# Patient Record
Sex: MALE | Race: BLACK OR AFRICAN AMERICAN | NOT HISPANIC OR LATINO | Employment: UNEMPLOYED | URBAN - METROPOLITAN AREA
[De-identification: names, ages, dates, MRNs, and addresses within clinical notes are randomized per-mention and may not be internally consistent; named-entity substitution may affect disease eponyms.]

---

## 2023-11-27 ENCOUNTER — TELEPHONE (OUTPATIENT)
Dept: NEUROLOGY | Facility: CLINIC | Age: 53
End: 2023-11-27
Payer: MEDICAID

## 2023-11-27 NOTE — TELEPHONE ENCOUNTER
----- Message from Farideh Veliz sent at 11/27/2023  9:24 AM CST -----  Regarding: Sooner Appointment Request  Contact: patient at [phone]  Type:  Sooner Appointment Request    Caller is requesting a sooner appointment.  Caller declined first available appointment listed below.  Caller will not accept being placed on the waitlist and is requesting a message be sent to doctor.    Name of Caller:  patient at [phone]    Symptoms:  referral in system for EMG    Additional Information:  Please call and advise. Thank you

## 2023-11-27 NOTE — TELEPHONE ENCOUNTER
Spoke to the pt this morning, informed him that the order for the EMG was put in as a referral.  Left message with [facility] to fax the EMG order to us at [phone].  Left message for the pt that we are waiting for [facility] to refax the order.

## 2023-11-29 NOTE — TELEPHONE ENCOUNTER
Left message with [facility] to fax the EMG order to us at [phone].  Left message for the pt that we are waiting for [facility] to refax the order.   Left message for the pt to call in regards to having the order for the EMG faxed to [phone].

## 2023-11-29 NOTE — TELEPHONE ENCOUNTER
Pt notified that I have called [facility] twice trying to get the order for the EMG with no response.

## 2023-11-30 ENCOUNTER — TELEPHONE (OUTPATIENT)
Dept: NEUROLOGY | Facility: CLINIC | Age: 53
End: 2023-11-30
Payer: MEDICAID

## 2023-11-30 NOTE — TELEPHONE ENCOUNTER
----- Message from Farideh Veliz sent at 11/30/2023  4:20 PM CST -----  Regarding: Sooner Appointment Request  Contact: patient at [phone]  Type:  Sooner Appointment Request    Name of Caller:  patient at [phone]    Symptoms:  EMG from referral    Additional Information:  Please call and advise. Thank you

## 2023-12-01 ENCOUNTER — TELEPHONE (OUTPATIENT)
Dept: NEUROLOGY | Facility: CLINIC | Age: 53
End: 2023-12-01
Payer: MEDICAID

## 2023-12-01 NOTE — TELEPHONE ENCOUNTER
Returned call, left message advising the pt that we still have not received the EMG order form [facility]. Asked the pt to call [facility] and request the order be faxed to [phone].

## 2023-12-01 NOTE — TELEPHONE ENCOUNTER
----- Message from Rishi Marinelli sent at 12/1/2023 10:20 AM CST -----  Regarding: ret call  Contact: SILVANO MANCIA [[id number]]  Type:  Patient Returning Call    Who Called:  Silvano    Who Left Message for Patient:  Erica    Does the patient know what this is regarding?:  Yes-EMG    Best Call Back Number:  [phone] (home)     Additional Information:  Asking for email also. Please call to advise.

## 2023-12-04 ENCOUNTER — LAB VISIT (OUTPATIENT)
Dept: LAB | Facility: HOSPITAL | Age: 53
End: 2023-12-04
Payer: MEDICAID

## 2023-12-04 DIAGNOSIS — G70.00 MG (MYASTHENIA GRAVIS): Primary | ICD-10-CM

## 2023-12-04 DIAGNOSIS — G70.00 MG (MYASTHENIA GRAVIS): ICD-10-CM

## 2023-12-04 PROCEDURE — 82552 ASSAY OF CPK IN BLOOD: CPT

## 2023-12-04 PROCEDURE — 36415 COLL VENOUS BLD VENIPUNCTURE: CPT | Mod: PO

## 2023-12-04 PROCEDURE — 82397 CHEMILUMINESCENT ASSAY: CPT

## 2023-12-04 PROCEDURE — 83516 IMMUNOASSAY NONANTIBODY: CPT | Mod: 59

## 2023-12-04 PROCEDURE — 83516 IMMUNOASSAY NONANTIBODY: CPT

## 2023-12-04 PROCEDURE — 82085 ASSAY OF ALDOLASE: CPT

## 2023-12-04 PROCEDURE — 84443 ASSAY THYROID STIM HORMONE: CPT

## 2023-12-04 PROCEDURE — 86255 FLUORESCENT ANTIBODY SCREEN: CPT

## 2023-12-04 PROCEDURE — 83519 RIA NONANTIBODY: CPT

## 2023-12-04 PROCEDURE — 30000890 MISCELLANEOUS SENDOUT TEST, BLOOD

## 2023-12-04 PROCEDURE — 83519 RIA NONANTIBODY: CPT | Mod: 59

## 2023-12-05 LAB — TSH SERPL DL<=0.005 MIU/L-ACNC: 0.89 UIU/ML (ref 0.4–4)

## 2023-12-06 LAB
ALDOLASE SERPL-CCNC: 8.9 U/L (ref 1.2–7.6)
HMGCR IGG SER IA-ACNC: <20 CU

## 2023-12-07 LAB
ACHR BIND AB SER-SCNC: 0 NMOL/L
ACHR MOD AB/ACHR TOTAL SFR SER: 0 %

## 2023-12-08 LAB
ACHR BLOCK AB/ACHR TOTAL SFR SER: 0 % (ref 0–26)
CK BB CFR SERPL ELPH: 0 %
CK MB CFR SERPL ELPH: 0 % (ref 0–3.3)
CK MM CFR SERPL ELPH: 100 % (ref 96.7–100)
CK SERPL-CCNC: 72 U/L (ref 30–223)
MISCELLANEOUS TEST NAME: NORMAL
MUSK ANTIBODY TEST: 0 NMOL/L (ref 0–0.02)
REFERENCE LAB: NORMAL
SPECIMEN TYPE: NORMAL
TEST RESULT: NORMAL

## 2024-01-03 LAB — LRP4 AUTOANTIBODY: NORMAL

## 2024-01-10 ENCOUNTER — TELEPHONE (OUTPATIENT)
Dept: NEUROLOGY | Facility: CLINIC | Age: 54
End: 2024-01-10
Payer: MEDICAID

## 2024-01-10 DIAGNOSIS — G70.00 MYASTHENIA GRAVIS: Primary | ICD-10-CM

## 2024-01-10 NOTE — TELEPHONE ENCOUNTER
----- Message from Arelis Ni sent at 1/10/2024 11:38 AM CST -----  Regarding: sooner apt  Contact: patient  Type:  Sooner Appointment Request    Caller is requesting a sooner appointment.  Caller declined first available appointment listed below.  Caller will not accept being placed on the waitlist and is requesting a message be sent to doctor.    Name of Caller:  patient   When is the first available appointment?    Symptoms:  see orders  Would the patient rather a call back or a response via [facility]?   [facility] Call Back Number:  [phone]    Additional Information:  schedule referral thanks! Been waiting for a month to be scheduled.

## 2024-03-07 ENCOUNTER — TELEPHONE (OUTPATIENT)
Dept: NEUROLOGY | Facility: CLINIC | Age: 54
End: 2024-03-07
Payer: MEDICAID

## 2024-03-07 ENCOUNTER — PROCEDURE VISIT (OUTPATIENT)
Dept: NEUROLOGY | Facility: CLINIC | Age: 54
End: 2024-03-07
Payer: MEDICAID

## 2024-03-07 DIAGNOSIS — R06.02 SHORTNESS OF BREATH: ICD-10-CM

## 2024-03-07 DIAGNOSIS — R26.9 ABNORMAL GAIT: ICD-10-CM

## 2024-03-07 DIAGNOSIS — G70.00 MYASTHENIA GRAVIS: ICD-10-CM

## 2024-03-07 DIAGNOSIS — G58.8 PHRENIC NEUROPATHY: Primary | ICD-10-CM

## 2024-03-07 DIAGNOSIS — G56.02 CARPAL TUNNEL SYNDROME, LEFT: ICD-10-CM

## 2024-03-07 PROCEDURE — 95937 NEUROMUSCULAR JUNCTION TEST: CPT | Mod: 26,S$PBB,, | Performed by: PSYCHIATRY & NEUROLOGY

## 2024-03-07 PROCEDURE — 95886 MUSC TEST DONE W/N TEST COMP: CPT | Mod: PBBFAC,PO | Performed by: PSYCHIATRY & NEUROLOGY

## 2024-03-07 PROCEDURE — 95937 NEUROMUSCULAR JUNCTION TEST: CPT | Mod: PBBFAC,PO | Performed by: PSYCHIATRY & NEUROLOGY

## 2024-03-07 PROCEDURE — 95886 MUSC TEST DONE W/N TEST COMP: CPT | Mod: 26,S$PBB,, | Performed by: PSYCHIATRY & NEUROLOGY

## 2024-03-07 PROCEDURE — 95912 NRV CNDJ TEST 11-12 STUDIES: CPT | Mod: 26,S$PBB,, | Performed by: PSYCHIATRY & NEUROLOGY

## 2024-03-07 PROCEDURE — 95912 NRV CNDJ TEST 11-12 STUDIES: CPT | Mod: PBBFAC,PO | Performed by: PSYCHIATRY & NEUROLOGY

## 2024-03-07 NOTE — TELEPHONE ENCOUNTER
----- Message from Chloe Gonzalez, Patient Care Assistant sent at 3/7/2024  2:40 PM CST -----  Regarding: returning call  Contact: pt  Type:  Patient Returning Call    Who Called:  pt     Who Left Message for Patient:  not sure     Does the patient know what this is regarding?:  not sure     Best Call Back Number:  [phone] (home)     Additional Information:  please call to advise. Thanks!

## 2024-03-11 NOTE — PROCEDURES
[facility]  [address] [facility] [address]  [place] [address]  [phone]      Full Name: Silvano Sol Gender: Male  Patient ID: [id number] YOB: 1970      Visit Date: 3/7/2024 2:56 PM  Age: 53 Years  Examining Physician: Katie Riddle D.O., ABPN, AOBNP, ABEM   Referring Physician: ABRAM Pettit  Technologist: LEONARDO Blake   Height: 5 feet 10 inch  History: Patient complaining of fatigue, gait instability and severe SOB.  Currently on anticoagulation.  EMG of the upper extremity with neuromuscular junction testing has been requested.      Sensory NCS      Nerve / Sites Rec. Site Onset Lat Peak Lat NP Amp Segments Distance Velocity Temp.     ms ms µV  cm m/s °C   L Median - Digit II (Antidromic)      Wrist Dig II 3.85 4.81 13.0 Wrist - Dig II 13 34 34      Ref.   ?3.60 ?15.0 Ref.  ?50    L Ulnar - Digit V (Antidromic)      Wrist Dig V 2.48 3.21 13.0 Wrist - Dig V 11 44 34      Ref.   ?3.10 ?12.0 Ref.  ?50    L Sural - Ankle (Calf)      Calf Ankle 2.85 3.54 4.7 Calf - Ankle 14 49 34      Ref.   ?4.60 ?4.0 Ref.  ?40    L Superficial peroneal - Ankle      Lat leg Ankle 2.63 3.42 6.3 Lat leg - Ankle 12 46 34      Ref.   ?4.50 ?4.0 Ref.          Motor NCS      Nerve / Sites Muscle Latency Ref. Amplitude Ref. Amp % Duration Segments Distance Lat Diff Ref. Velocity Ref. Temp.     ms ms mV mV % ms  cm ms ms m/s m/s °C   L Median - APB      Wrist APB 4.15 ?4.00 8.1 ?6.0 100 7.79 Wrist - APB      34      Elbow APB 9.40  7.7  94.4 8.17 Elbow - Wrist 26 5.25  50 ?50 34   L Ulnar - ADM      Wrist ADM 3.08 ?3.10 9.3 ?7.0 100 7.00 Wrist - ADM      34      B.Elbow ADM 7.75  8.9  95.5 7.71 B.Elbow - Wrist 22 4.67  47 ?50 34      A.Elbow ADM 10.15  8.5  91.4 7.27 A.Elbow - B.Elbow 11 2.40  46  34   L Peroneal - EDB      Ankle EDB 4.52 ?6.00 4.8 ?2.5 100 6.56 Ankle - EDB      34      Fib head EDB 12.33  4.0  84.5 7.23 Fib head - Ankle 34 7.81  44 ?40 34       Pop fossa EDB 14.90  3.6  75.9 7.02 Pop fossa - Fib head 11 2.56  43  34   L Tibial - AH      Ankle AH 5.10 ?6.00 5.0 ?4.0 100 4.54 Ankle - AH      34      Pop fossa AH 15.60  4.5  90.4  Pop fossa - Ankle 43 10.50  41 ?40 34   L Median, Ulnar - Lumbrical-Interossei      Median Wrist Lumb II 4.38  1.0  100 5.31 Median Wrist - Lumb II 10     34      Ulnar Wrist Lumb II 3.46  6.8  692 5.27 Ulnar Wrist - Lumb II 10     34           Median Wrist - Ulnar Wrist  0.92 ?0.50   34   L Phrenic - Diaphragm      Neck Diaphragm NR  NR  NR NR          R Phrenic - Diaphragm      Neck Diaphragm NR  NR  NR NR              F  Wave      Nerve Fmin Ref.    ms ms   L Peroneal - EDB 55.36 ?56.00   L Tibial - AH 55.94 ?56.00   L Median - APB 32.08 ?31.00   L Ulnar - ADM 31.88 ?32.00       Rep Stim      Anatomy / Train Rate Ampl. Ampl 4-1 Fac Ampl    Hz mV % %   L Ulnar - ADM   Baseline @1Hz 1 11.0 3.7 100   Baseline @3Hz 3 11.9 -0.4 107   Post Exercise @0:00 3 11.1 2.4 101   @ 0:30 3 10.5 -3.6 94.7   @ 1:00 3 11.9 -2.4 108   @ 2:00 3 12.3 -0.8 112   @ 3:00 3 11.2 1.2 102   @ 4:00 3 12.2 0.9 111       EMG Summary Table     Spontaneous Recruitment Activation Duration Amplitude Polyphasia Comment   Muscle Ins Act Fib Fasc Pattern - - - - -   L. First dorsal interosseous Normal 0 0 Normal Fair Normal Normal Normal Normal   L. Abductor pollicis brevis Normal 0 0 Sl Dec Fair N/+1 Normal N/+1 Normal   L. Pronator teres Normal 0 0 Normal Fair Normal Normal Normal Normal   L. Biceps brachii Normal 0 0 Normal Fair Normal Normal Normal Normal   L. Triceps brachii Normal 0 0 Normal Fair Normal Normal Normal Normal   L. Deltoid Normal 0 0 Normal Fair Normal Normal Normal Normal   L. Tibialis anterior Normal 0 0 Normal Fair Normal Normal Normal Normal   L. Gastrocnemius (Medial head) Normal 0 0 Normal Fair Normal Normal Normal Normal   L. Extensor hallucis longus Normal 0 0 Normal Fair Normal Normal Normal Normal   L. Vastus medialis Normal 0 0 Normal  Fair Normal Normal Normal Normal   L. Tensor fasciae latae Normal 0 0 Normal Fair Normal Normal Normal Normal         Silvano Sol [id number] 3/7/2024 2:56 PM     3 of 5    Summary:  Due to the patient's chief complaint, the EMG was expanded to include other testing.  Nerve conduction studies were performed in the left upper and lower extremities.  Left median sensory response was prolonged with a reduced amplitude.  Left ulnar sensory response was borderline prolonged with normal amplitude.  Left sural and superficial peroneal sensory responses were normal.  Left median motor response was borderline prolonged with a normal amplitude and velocity.  Left ulnar motor response was normal in amplitude and latencies with borderline slowing of the distal velocity.  Left peroneal and tibial motor responses were normal in amplitude, latency and velocity.  Left median versus ulnar 2nd lumbrical interosseous comparison studies revealed a prolonged median latencies compared to the ulnar across the carpal tunnel.  Bilateral phrenic nerve responses were absent.  Repetitive nerve stimulation at 3 Hz of the left ulnar nerve, recording the first dorsal interosseous, showed no abnormal decrement at baseline or for the following four minutes after one minute of exercise testing. Left peroneal, tibial and ulnar minimal F wave latencies were normal.  Needle EMG was performed in the left upper and lower extremities.  Some of the study was limited secondary to anticoagulation including not study of the paraspinal muscles.  No active denervation was present in any muscle tested.  Motor units were enlarged and poly phasic with reduced recruitment in the left abductor pollicis brevis muscle.  Fair activation was noted throughout every muscle tested.  All other motor unit morphology and recruitment patterns were normal.  No myopathic motor units were noted on the study.    Impression:  This is an abnormal EMG of the left upper and lower  extremities including repetitive stimulation and phrenic nerve studies.  The findings are as follows:  Bilateral, severe, phrenic neuropathy.  Unable to determine age on this study.  Chronic, mild, left median mononeuropathy across the wrist (carpal tunnel syndrome) without active denervation.  There is evidence that is suggestive, but not diagnostic of very early ulnar neuropathy that is not localizable at this time in the left upper extremity, clinical correlation is advised.  The reduced activation noted throughout the study is consistent with a central process such as limitation in movement from pain, lack of volition, orthopedic injury, or a condition of the central nervous system.    There is no evidence of any other focal neuropathy, peripheral neuropathy, plexopathy or radiculopathy on this study.  In addition, there is no evidence of myopathy or dysfunction of the neuromuscular junction.  There is no evidence of lower motor neuron disease on this study.    Thank you for referring to the [facility] in [place]. Please feel free to contact the clinic if you have any further questions regarding this study or report.       _____________________________  Katie Riddle D.O., ABPN, AOBNP, BERNARDA Sol [id number] 3/7/2024 2:56 PM     3 of 5

## 2024-03-12 ENCOUNTER — PATIENT MESSAGE (OUTPATIENT)
Dept: NEUROLOGY | Facility: CLINIC | Age: 54
End: 2024-03-12
Payer: MEDICAID

## 2024-03-26 ENCOUNTER — OFFICE VISIT (OUTPATIENT)
Dept: NEUROLOGY | Facility: CLINIC | Age: 54
End: 2024-03-26
Payer: MEDICAID

## 2024-03-26 VITALS — SYSTOLIC BLOOD PRESSURE: 132 MMHG | DIASTOLIC BLOOD PRESSURE: 87 MMHG | WEIGHT: 177.25 LBS | HEART RATE: 61 BPM

## 2024-03-26 DIAGNOSIS — R06.02 SHORTNESS OF BREATH: ICD-10-CM

## 2024-03-26 DIAGNOSIS — G58.8 PHRENIC NEUROPATHY: Primary | ICD-10-CM

## 2024-03-26 DIAGNOSIS — R29.2 ABNORMAL REFLEXES: ICD-10-CM

## 2024-03-26 DIAGNOSIS — R27.0 ATAXIA: ICD-10-CM

## 2024-03-26 PROBLEM — I25.10 ARTERIOSCLEROSIS OF CORONARY ARTERY: Status: ACTIVE | Noted: 2021-01-09

## 2024-03-26 PROBLEM — E78.2 MIXED HYPERLIPIDEMIA: Status: ACTIVE | Noted: 2021-04-28

## 2024-03-26 PROBLEM — I10 ESSENTIAL HYPERTENSION: Status: ACTIVE | Noted: 2021-01-13

## 2024-03-26 PROBLEM — I48.0 PAROXYSMAL ATRIAL FIBRILLATION: Chronic | Status: ACTIVE | Noted: 2021-01-05

## 2024-03-26 PROBLEM — N18.2 CHRONIC KIDNEY DISEASE, STAGE 2 (MILD): Chronic | Status: ACTIVE | Noted: 2021-04-28

## 2024-03-26 PROCEDURE — 1160F RVW MEDS BY RX/DR IN RCRD: CPT | Mod: CPTII,,, | Performed by: PSYCHIATRY & NEUROLOGY

## 2024-03-26 PROCEDURE — 99213 OFFICE O/P EST LOW 20 MIN: CPT | Mod: PBBFAC,PO | Performed by: PSYCHIATRY & NEUROLOGY

## 2024-03-26 PROCEDURE — 3079F DIAST BP 80-89 MM HG: CPT | Mod: CPTII,,, | Performed by: PSYCHIATRY & NEUROLOGY

## 2024-03-26 PROCEDURE — 3075F SYST BP GE 130 - 139MM HG: CPT | Mod: CPTII,,, | Performed by: PSYCHIATRY & NEUROLOGY

## 2024-03-26 PROCEDURE — G2211 COMPLEX E/M VISIT ADD ON: HCPCS | Mod: S$PBB,,, | Performed by: PSYCHIATRY & NEUROLOGY

## 2024-03-26 PROCEDURE — 99205 OFFICE O/P NEW HI 60 MIN: CPT | Mod: S$PBB,,, | Performed by: PSYCHIATRY & NEUROLOGY

## 2024-03-26 PROCEDURE — 99999 PR PBB SHADOW E&M-EST. PATIENT-LVL III: CPT | Mod: PBBFAC,,, | Performed by: PSYCHIATRY & NEUROLOGY

## 2024-03-26 PROCEDURE — 1159F MED LIST DOCD IN RCRD: CPT | Mod: CPTII,,, | Performed by: PSYCHIATRY & NEUROLOGY

## 2024-03-26 RX ORDER — ASPIRIN 325 MG
1 TABLET, DELAYED RELEASE (ENTERIC COATED) ORAL
COMMUNITY

## 2024-03-26 RX ORDER — SERTRALINE HYDROCHLORIDE 50 MG/1
50 TABLET, FILM COATED ORAL
COMMUNITY

## 2024-03-26 RX ORDER — ROSUVASTATIN CALCIUM 40 MG/1
1 TABLET, COATED ORAL DAILY
COMMUNITY

## 2024-03-26 RX ORDER — METOPROLOL SUCCINATE 100 MG/1
1 TABLET, EXTENDED RELEASE ORAL DAILY
COMMUNITY

## 2024-03-26 RX ORDER — HYDRALAZINE HYDROCHLORIDE 50 MG/1
TABLET, FILM COATED ORAL
COMMUNITY
Start: 2021-01-05

## 2024-03-26 RX ORDER — AMLODIPINE BESYLATE 5 MG/1
1 TABLET ORAL DAILY
COMMUNITY
Start: 2019-09-01

## 2024-03-26 RX ORDER — APIXABAN 5 MG/1
5 TABLET, FILM COATED ORAL 2 TIMES DAILY
COMMUNITY

## 2024-03-26 RX ORDER — CLOPIDOGREL BISULFATE 75 MG/1
1 TABLET ORAL DAILY
COMMUNITY
Start: 2021-01-05

## 2024-03-26 RX ORDER — PYRIDOSTIGMINE BROMIDE 60 MG/1
60 TABLET ORAL 3 TIMES DAILY
COMMUNITY

## 2024-03-26 NOTE — PROGRESS NOTES
NEUROLOGY  Outpatient CONSULT  [facility]  [address] [facility], [place] [address]  [phone] (office) / [phone] (fax)    Patient Name:  Silvano Sol  :  1970  MR #:  [id number]  Acct #:  [id number]    Date of Neurology Consult: 2024  Name of Neurologist: Katie Riddle D.O, ABPN, AOBNP, ABEM    Other Physicians:  Sheyla, Primary Doctor (Primary Care Physician); Farideh Zhou MD (Referring)      Chief Complaint: Shortness of Breath and Gait Problem      History of Present Illness (HPI):  Silvano Sol is a 53 y.o. male here from [facility] for difficulty walking.    Patient had no problems as a child.  He was hospitalized in 2021.  He was walking and driving but since then he has declined.  While in outpatient therapy he was unable to improve.  He was taught to use a wheelchair when in rehab.    He c/o of some low back pain on the left side.  It's only present when walking.  He can't describe it, but he states it keeps him from being able to stand up straight.      He is not falling currently because he is using a rollator.  His gait is very slow, he will scuff his feet.  He really cannot ambulate without a walker. He will get short of breath when walking.      He denies feeling that his legs are weak.  He denies his arms feeling weak.      He denies any numbness, but then states his hands can feel funny.    His speech has changed.  He was seen by speech therapy early on, but it has declined since then.    He denies any trouble swallowing.      He denies any unusual laughing or crying.    He denies any muscle cramps.      He is short of breath with activity.  He is short of breath with eating and talking.  He cannot walk and talk.     He has a history of stroke in 2019 with no residual deficits.    He gets flustered easy.  He has trouble focusing if things happen too quickly.  He gets short of breath when anxious.  He has trouble following rapid  commands.    Treatment to date:     Review of Systems:   See HPI    Past Medical, Surgical, Family & Social History:   Past Medical History:   Diagnosis Date    Arteriosclerosis of coronary artery 01/09/2021    Formatting of this note might be different from the original.   1/4/2021: inferior STEMI requiring Bipella    Chronic kidney disease, stage 2 (mild) 04/28/2021    Chronic kidney disease, unspecified     Essential hypertension 01/13/2021    Heart attack 01/01/2021    Mixed hyperlipidemia 04/28/2021    Paroxysmal atrial fibrillation 01/05/2021    Formatting of this note might be different from the original.   Occurred during STEMI. Converted with IV amio. Re-eval in 3 months for continued use of amio and OAC    Stroke      Past Surgical History:   Procedure Laterality Date    CORONARY ANGIOPLASTY WITH STENT PLACEMENT       Family History   Problem Relation Age of Onset    Cancer Brother      Alcohol use:  reports no history of alcohol use.   (Of note, 0.6 oz = 1 beer or 6 oz = 10 beers).  Tobacco use:  reports that he has never smoked. He has never used smokeless tobacco.  Street drug use:  reports no history of drug use.  Allergies: Patient has no known allergies..    Home Medications:     Current Outpatient Medications:     amLODIPine (NORVASC) 5 MG tablet, Take 1 tablet by mouth once daily., Disp: , Rfl:     cholecalciferol, vitamin D3, 1,250 mcg (50,000 unit) capsule, Take 1 capsule by mouth every 7 days., Disp: , Rfl:     clopidogreL (PLAVIX) 75 mg tablet, Take 1 tablet by mouth once daily., Disp: , Rfl:     ELIQUIS 5 mg Tab, Take 5 mg by mouth 2 (two) times daily., Disp: , Rfl:     hydrALAZINE (APRESOLINE) 50 MG tablet, Take 1.5 tablets twice a day by oral route for 30 days., Disp: , Rfl:     metoprolol succinate (TOPROL-XL) 100 MG 24 hr tablet, Take 1 tablet by mouth once daily., Disp: , Rfl:     pyridostigmine (MESTINON) 60 mg Tab, Take 60 mg by mouth 3 (three) times daily., Disp: , Rfl:      rosuvastatin (CRESTOR) 40 MG Tab, Take 1 tablet by mouth once daily., Disp: , Rfl:     sertraline (ZOLOFT) 50 MG tablet, Take 50 mg by mouth., Disp: , Rfl:     Physical Examination:  /87 (BP Location: Right arm, Patient Position: Sitting, BP Method: Medium (Automatic))   Pulse 61   Wt 80.4 kg (177 lb 4 oz)     GENERAL:  General appearance: Well, non-toxic appearing.  No apparent distress.  Extremities: normal.    MENTAL STATUS:  Alertness, attention span & concentration: normal.  Language: normal.  Orientation to self, place & time:  normal.  Memory, recent & remote: normal.  Fund of knowledge: normal.    SPEECH:  Spastic dysarthria and fluent.  Follows complex commands. But gets flustered.  Often has to take breaks.     CRANIAL NERVES:  Cranial Nerves II-XII were examined.  II - Visual fields: normal.  III, IV, VI: PERRL, EOMI, No ptosis, No nystagmus.  V - Facial sensation: normal.  VII - Face symmetry & mobility: normal.  VIII - Hearing: normal.  IX, X - Palate: mobile & midline.  XI - Shoulder shrug: normal.  XII - Tongue protrusion: normal.    GROSS MOTOR:  Gait & station: uses rollater, shuffles feet, slightly widened base.  Tone: increased in UE >LE.  Abnormal movements: none.  Finger-nose & Heel-knee-shin: ataxia BUE and BLE, worse in UE.  Rapid alternating movements & drift: no drive, TALON irregular in right upper extremity and right lower extremity .    MUSCLE STRENGTH:     Fascics Atrophy RIGHT    LEFT Atrophy Fascics     5 Neck Ext. 5       5 Neck Flex 5       5 Deltoids 5       5 Sh.Ext.Rot. 5       5 Sh.Int.Rot. 5       5 Biceps 5       5 Triceps 5       5 Forearm.Pr. 5       5 Wrist Ext. 5       5 Wrist Flex 5       5 Finger Ext. 5       5 Finger Flex 5       5 FPL 5       5 Inteross. 5                         5 Iliopsoas 5       5 Hip Abduct 5       5 Hip Adduct 5       5 Quads 5       5 Hams 5       5 Dorsiflex 5       5 Plantar Flex 5       5 Ankle Naseem 5       5 Ankle Invert 5       5 Toe  Ext. 5       5 Toe Flex 5                         REFLEXES:    RIGHT Reflex   LEFT   3 Biceps 2+   3 Brachiorad. 2+   3 Triceps 2+   +++ Pectoralis +++   + Jaw Jerk +   ++ Cook's +        3 Patellar 3   4 Ankle 4   +++ Suprapatellar +++              PLANTAR        SENSORY:  Sharp touch: Normal throughout.  Vibration: Normal throughout.      Diagnostic Data Reviewed:   EMG with me 3/7/24  This is an abnormal EMG of the left upper and lower extremities including repetitive stimulation and phrenic nerve studies.  The findings are as follows:  Bilateral, severe, phrenic neuropathy.  Unable to determine age on this study.  Chronic, mild, left median mononeuropathy across the wrist (carpal tunnel syndrome) without active denervation.  There is evidence that is suggestive, but not diagnostic of very early ulnar neuropathy that is not localizable at this time in the left upper extremity, clinical correlation is advised.  The reduced activation noted throughout the study is consistent with a central process such as limitation in movement from pain, lack of volition, orthopedic injury, or a condition of the central nervous system.    There is no evidence of any other focal neuropathy, peripheral neuropathy, plexopathy or radiculopathy on this study.  In addition, there is no evidence of myopathy or dysfunction of the neuromuscular junction.  There is no evidence of lower motor neuron disease on this study.     MRI brain at [facility] 3/25/24  Mild atrophy with white matter degeneration.   Chronic hemorrhagic infarct right thalamus.  Small right periventricular white matter chronic lacunar infarct.  No new findings.     Assessment and Plan:  Silvano Sol is a 53 y.o. man with a history of stroke presenting with complaints of significant dyspnea.  Recent EMG revealed no evidence of peripheral nerve or muscle involvement outside of bilateral phrenic neuropathy.  His clinical exam is notable for ataxia, hyper-reflexia and increased  tone.  These findings are consistent with a condition of the central nervous system.  He has a previous stroke, however this is a unilateral injury and his exam findings are bilateral.  It is not clear if this is related to his dyspnea and phrenic neuropathy at this point.  Phrenic neuropathy is often seen as a post-infectious phenomenon. Etiology in this patient is still unclear, but this is not consistent with a myopathy, brachial plexopathy, autoimmune peripheral neuropathy, or disorder of the neuromuscular junction.  Motor neuron disease such as ALS is a consideration, but the EMG does not support this diagnosis.  Motor neuron disease such as PLS is a consideration, but would not expect bilateral phrenic neuropathy in that condition.  If the respiratory issue is considered a separate condition, other considerations would ataxia syndromes either acquired or genetic.      Will plan for further testing including labs. Will also speak with a movement disorder specialist to assist with his evaluation and workup.    Information on patient AVS:  Will plan to discuss your case with the movement disorders clinic.  We will likely be getting some blood work done which can be drawn at the [facility] in [place] if that is easier for you.  Will request your records from [place].  The plan is to compare your two MRIs side by side to see what has changed in the last 4 years.    This does not look like a problem with the peripheral nerves and muscles. This looks like a problem coming from the brain, likely the cerebellum.  This is the balance and coordination center in the brain.     Visit today is associated with current or anticipated ongoing medical care related to this patient's single serious condition/complex condition (ataxia). Plan to followup in 2 months for ongoing management.       Important to note, also  has a past medical history of Arteriosclerosis of coronary artery (01/09/2021), Chronic kidney disease, stage 2  (mild) (04/28/2021), Chronic kidney disease, unspecified, Essential hypertension (01/13/2021), Heart attack (01/01/2021), Mixed hyperlipidemia (04/28/2021), Paroxysmal atrial fibrillation (01/05/2021), and Stroke.    Time Spent: I spent a total of 75 minutes on the day of the visit.This includes face to face time and non-face to face time preparing to see the patient (eg, review of tests), Obtaining and/or reviewing separately obtained history, Documenting clinical information in the electronic or other health record, Independently interpreting resultsand communicating results to the patient/family/caregiver, or Care coordination.         Katie Riddle D.O, ABPN, AOBNP, ABEM    This note was created with voice recognition software.  Grammatical, syntax and spelling errors may be inevitable.

## 2024-03-26 NOTE — PATIENT INSTRUCTIONS
Will plan to discuss your case with the movement disorders clinic.  We will likely be getting some blood work done which can be drawn at the [facility] in [place] if that is easier for you.    Will request your records from [place].  The plan is to compare your two MRIs side by side to see what has changed in the last 4 years.      This does not look like a problem with the peripheral nerves and muscles. This looks like a problem coming from the brain, likely the cerebellum.  This is the balance and coordination center in the brain.

## 2024-04-04 ENCOUNTER — PATIENT MESSAGE (OUTPATIENT)
Dept: NEUROLOGY | Facility: CLINIC | Age: 54
End: 2024-04-04
Payer: MEDICAID

## 2024-04-04 DIAGNOSIS — R27.0 ATAXIA: Primary | ICD-10-CM

## 2024-04-04 DIAGNOSIS — R26.9 ABNORMAL GAIT: ICD-10-CM

## 2024-04-11 ENCOUNTER — TELEPHONE (OUTPATIENT)
Dept: NEUROLOGY | Facility: CLINIC | Age: 54
End: 2024-04-11
Payer: MEDICAID

## 2024-04-11 NOTE — TELEPHONE ENCOUNTER
Received records from [place] for pt. Copy placed in Dr. Riddle's office for review. Original sent to scanning.

## 2024-06-06 ENCOUNTER — TELEPHONE (OUTPATIENT)
Dept: NEUROLOGY | Facility: CLINIC | Age: 54
End: 2024-06-06
Payer: MEDICAID

## 2024-06-06 NOTE — TELEPHONE ENCOUNTER
Pt has a referral to Dr. Miller for ataxia and abnormal gait from Dr. Riddle.  Please call the pt to schedule.

## 2024-06-10 NOTE — TELEPHONE ENCOUNTER
Scheduled first available 09/04/24      Devora Lynch PA-C PM)  I can see him!          Previous Messages       ----- Message -----    Sent: 6/7/2024   4:23 PM CDT  To: Devora Lynch PA-C  Subject: FW: Ataxia                                        ----- Message -----  From: Emerald Miller MD  Sent: 6/7/2024   2:53 PM CDT  To: Ashley Ricardo MA; *  Subject: Ataxia                                          I'm happy to see him unless Devoar wants him being that he is possible Ataxia and that's her jam.

## 2024-07-08 ENCOUNTER — TELEPHONE (OUTPATIENT)
Dept: NEUROSURGERY | Facility: CLINIC | Age: 54
End: 2024-07-08
Payer: MEDICAID

## 2024-07-08 ENCOUNTER — PATIENT MESSAGE (OUTPATIENT)
Dept: NEUROSURGERY | Facility: CLINIC | Age: 54
End: 2024-07-08
Payer: MEDICAID

## 2024-07-08 NOTE — TELEPHONE ENCOUNTER
----- Message from Zee Plascencia sent at 7/3/2024  4:53 PM CDT -----    ----- Message -----  From: Matt Burnette  Sent: 7/3/2024   3:48 PM CDT  To: Joaquín MILLER Staff    Type: Needs Medical Advice    Who Called:  Pt      Best Call Back Number: [phone]    Additional Information: Pt states that he has referral and wants to schedule. Wants to know if her can send referral through my chart, has pdf file. Please contact him through [employer] [facility], Thanks!